# Patient Record
Sex: MALE | Race: WHITE | Employment: OTHER | URBAN - METROPOLITAN AREA
[De-identification: names, ages, dates, MRNs, and addresses within clinical notes are randomized per-mention and may not be internally consistent; named-entity substitution may affect disease eponyms.]

---

## 2022-02-23 ENCOUNTER — APPOINTMENT (OUTPATIENT)
Dept: FAMILY MEDICINE CLINIC | Age: 62
End: 2022-02-23

## 2022-02-25 ENCOUNTER — OFFICE VISIT (OUTPATIENT)
Dept: INTERNAL MEDICINE CLINIC | Age: 62
End: 2022-02-25
Payer: OTHER GOVERNMENT

## 2022-02-25 VITALS
RESPIRATION RATE: 16 BRPM | TEMPERATURE: 98.1 F | DIASTOLIC BLOOD PRESSURE: 84 MMHG | HEART RATE: 61 BPM | WEIGHT: 192.2 LBS | BODY MASS INDEX: 25.47 KG/M2 | HEIGHT: 73 IN | OXYGEN SATURATION: 98 % | SYSTOLIC BLOOD PRESSURE: 142 MMHG

## 2022-02-25 DIAGNOSIS — F41.9 ANXIETY: ICD-10-CM

## 2022-02-25 DIAGNOSIS — Z00.00 PHYSICAL EXAM: ICD-10-CM

## 2022-02-25 DIAGNOSIS — R03.0 ELEVATED BLOOD PRESSURE READING IN OFFICE WITHOUT DIAGNOSIS OF HYPERTENSION: ICD-10-CM

## 2022-02-25 DIAGNOSIS — Z13.220 SCREENING FOR LIPID DISORDERS: ICD-10-CM

## 2022-02-25 DIAGNOSIS — R82.998 BROWN-COLORED URINE: Primary | ICD-10-CM

## 2022-02-25 DIAGNOSIS — Z00.00 LABORATORY EXAM ORDERED AS PART OF ROUTINE GENERAL MEDICAL EXAMINATION: ICD-10-CM

## 2022-02-25 LAB
ALBUMIN SERPL-MCNC: 4.3 G/DL (ref 3.5–5)
ALBUMIN/GLOB SERPL: 1.2 {RATIO} (ref 1.1–2.2)
ALP SERPL-CCNC: 71 U/L (ref 45–117)
ALT SERPL-CCNC: 28 U/L (ref 12–78)
ANION GAP SERPL CALC-SCNC: 5 MMOL/L (ref 5–15)
APPEARANCE UR: CLEAR
AST SERPL-CCNC: 18 U/L (ref 15–37)
BILIRUB SERPL-MCNC: 1.4 MG/DL (ref 0.2–1)
BILIRUB UR QL: NEGATIVE
BUN SERPL-MCNC: 13 MG/DL (ref 6–20)
BUN/CREAT SERPL: 9 (ref 12–20)
CALCIUM SERPL-MCNC: 9.7 MG/DL (ref 8.5–10.1)
CHLORIDE SERPL-SCNC: 103 MMOL/L (ref 97–108)
CHOLEST SERPL-MCNC: 207 MG/DL
CO2 SERPL-SCNC: 29 MMOL/L (ref 21–32)
COLOR UR: NORMAL
CREAT SERPL-MCNC: 1.43 MG/DL (ref 0.7–1.3)
CREAT UR-MCNC: 34.6 MG/DL
ERYTHROCYTE [DISTWIDTH] IN BLOOD BY AUTOMATED COUNT: 12.7 % (ref 11.5–14.5)
GLOBULIN SER CALC-MCNC: 3.5 G/DL (ref 2–4)
GLUCOSE SERPL-MCNC: 98 MG/DL (ref 65–100)
GLUCOSE UR STRIP.AUTO-MCNC: NEGATIVE MG/DL
HCT VFR BLD AUTO: 45.6 % (ref 36.6–50.3)
HDLC SERPL-MCNC: 70 MG/DL
HDLC SERPL: 3 {RATIO} (ref 0–5)
HGB BLD-MCNC: 14.7 G/DL (ref 12.1–17)
HGB UR QL STRIP: NEGATIVE
KETONES UR QL STRIP.AUTO: NEGATIVE MG/DL
LDLC SERPL CALC-MCNC: 103.8 MG/DL (ref 0–100)
LEUKOCYTE ESTERASE UR QL STRIP.AUTO: NEGATIVE
MCH RBC QN AUTO: 30.8 PG (ref 26–34)
MCHC RBC AUTO-ENTMCNC: 32.2 G/DL (ref 30–36.5)
MCV RBC AUTO: 95.6 FL (ref 80–99)
MICROALBUMIN UR-MCNC: <0.5 MG/DL
MICROALBUMIN/CREAT UR-RTO: <14 MG/G (ref 0–30)
NITRITE UR QL STRIP.AUTO: NEGATIVE
NRBC # BLD: 0 K/UL (ref 0–0.01)
NRBC BLD-RTO: 0 PER 100 WBC
PH UR STRIP: 5.5 [PH] (ref 5–8)
PLATELET # BLD AUTO: 242 K/UL (ref 150–400)
PMV BLD AUTO: 11.1 FL (ref 8.9–12.9)
POTASSIUM SERPL-SCNC: 3.6 MMOL/L (ref 3.5–5.1)
PROT SERPL-MCNC: 7.8 G/DL (ref 6.4–8.2)
PROT UR STRIP-MCNC: NEGATIVE MG/DL
PSA SERPL-MCNC: 0.6 NG/ML (ref 0.01–4)
RBC # BLD AUTO: 4.77 M/UL (ref 4.1–5.7)
SODIUM SERPL-SCNC: 137 MMOL/L (ref 136–145)
SP GR UR REFRACTOMETRY: <1.005 (ref 1–1.03)
TRIGL SERPL-MCNC: 166 MG/DL (ref ?–150)
TSH SERPL DL<=0.05 MIU/L-ACNC: 2.95 UIU/ML (ref 0.36–3.74)
UR CULT HOLD, URHOLD: NORMAL
UROBILINOGEN UR QL STRIP.AUTO: 0.2 EU/DL (ref 0.2–1)
VLDLC SERPL CALC-MCNC: 33.2 MG/DL
WBC # BLD AUTO: 6.9 K/UL (ref 4.1–11.1)

## 2022-02-25 PROCEDURE — 99204 OFFICE O/P NEW MOD 45 MIN: CPT | Performed by: NURSE PRACTITIONER

## 2022-02-25 NOTE — PROGRESS NOTES
Bailey Cast is a 57 y.o. male    Chief Complaint   Patient presents with    [facility]     new patient       Visit Vitals  BP (!) 142/100 (BP 1 Location: Left upper arm, BP Patient Position: Sitting, BP Cuff Size: Small adult)   Pulse 61   Temp 98.1 °F (36.7 °C)   Resp 16   Ht 6' 1\" (1.854 m)   Wt 192 lb 3.2 oz (87.2 kg)   SpO2 98%   BMI 25.36 kg/m²           1. Have you been to the ER, urgent care clinic since your last visit? Hospitalized since your last visit? NO    2. Have you seen or consulted any other health care providers outside of the [address] [place] [address] since your last visit? Include any pap smears or colon screening.  NO

## 2022-02-25 NOTE — PROGRESS NOTES
Chief Complaint   Patient presents with    Establish Care     new patient       SUBJECTIVE:    Micheal Maya is a 57 y.o. male who is here today to establish care as a new patient, and to discuss current medical concerns and observations. The patient states no significant medical history in the past.  He states he is conscientious of his health, and exercises regularly and eats a balanced diet. He states he often exercises by running. He denies any chest pain, chest pressure, shortness of breath, headaches, dizziness, blurred vision, palpitations, or syncope episodes. He states a history of one kidney stone in the past in 2015. He has not seen a primary care provider since that time. Chiefly, the patient complains of observing some \"brown urine\" approximately 5 days ago on Sunday evening. He states that it was a singular episode, and after drinking plenty of fluids, his urine returned to \"normal.\"  He states he had no further problems until yesterday when he noticed it was [place] again. \"  He denies any dysuria, urgency, or frequency. He denies any hematuria that he can detect. He denies any flank pain. He is admittedly anxious about these observations, and likely causing his blood pressure to be somewhat elevated today. History reviewed. No pertinent past medical history.   Past Surgical History:   Procedure Laterality Date    HX HEENT      Broke nose     No Known Allergies    REVIEW OF SYSTEMS:                                        POSITIVE= bold text  Negative = regular text    General:                     fever, chills, sweats, generalized weakness, weight loss/gain,                                       loss of appetite   Eyes:                           blurred vision, eye pain, loss of vision, double vision  ENT:                            rhinorrhea, pharyngitis   Respiratory:               cough, sputum production, SOB, CHAVIS, wheezing, pleuritic pain   Cardiology: chest pain, palpitations, orthopnea, PND, edema, syncope   Gastrointestinal:       abdominal pain , N/V, diarrhea, dysphagia, constipation, bleeding   Genitourinary:           frequency, urgency, dysuria, hematuria, incontinence   Muskuloskeletal :      arthralgia, myalgia, back pain  Hematology:              easy bruising, nose or gum bleeding, lymphadenopathy   Dermatological:         rash, ulceration, pruritis, color change / jaundice  Endocrine:                 hot flashes or polydipsia   Neurological:             headache, dizziness, confusion, focal weakness, paresthesia,                                      Speech difficulties, memory loss, gait difficulty  Psychological:          Feelings of anxiety, depression, agitation        Social History     Socioeconomic History    Marital status:    Tobacco Use    Smoking status: Never Smoker    Smokeless tobacco: Never Used   Vaping Use    Vaping Use: Never used   Substance and Sexual Activity    Alcohol use: Yes     Comment: occasionally    Drug use: No    Sexual activity: Yes     Partners: Female     Birth control/protection: None     Social Determinants of Health     Physical Activity: Sufficiently Active    Days of Exercise per Week: 6 days    Minutes of Exercise per Session: 40 min     Family History   Problem Relation Age of Onset    Cancer Father         prostate    Diabetes Mother        OBJECTIVE:     Visit Vitals  BP (!) 142/100 (BP 1 Location: Left upper arm, BP Patient Position: Sitting, BP Cuff Size: Small adult)   Pulse 61   Temp 98.1 °F (36.7 °C)   Resp 16   Ht 6' 1\" (1.854 m)   Wt 192 lb 3.2 oz (87.2 kg)   SpO2 98%   BMI 25.36 kg/m²       Constitutional: He appears well nourished, of stated age, and dressed appropriately. Eyes: Sclera anicteric, PERRLA, EOMI  Neck: Supple without lymphadenopathy.  Thyroid normal, No JVD or bruits  Respiratory: Clear to ascultation X5, normal inspiratory effort, no adventitious breath sounds. Cardiovascular: Regular rate and rhythm, no murmurs, rubs or gallops, PMI not displaced, No thrills, no peripheral edema  Gastrointestinal: Abdomen non-distended, soft, non-tender, bowel sounds normal and active X4. Hematologic: No purpura, petechiae or unexplained bruising  Lymphatic: No lymph node enlargemant. Integumentary: No unusual rashes or suspicious skin lesions noted. Neuro: Non-focal exam, A & O X 3.   Psychiatric: Appropriate affect and demeanor, pleasant and cooperative. Patient's thought content and thought processing appear to be within normal limits. ASSESSMENT/PLAN:     ICD-10-CM ICD-9-CM    1. Brown-colored urine  R82.998 791.9 XR ABD (KUB)      MICROALBUMIN, UR, RAND W/ MICROALB/CREAT RATIO      URINALYSIS W/ RFLX MICROSCOPIC   2. Elevated blood pressure reading in office without diagnosis of hypertension  R03.0 796.2 CBC W/O DIFF      METABOLIC PANEL, COMPREHENSIVE      TSH 3RD GENERATION   3. Anxiety  F41.9 300.00    4. Physical exam  Z00.00 V70.9    5. Laboratory exam ordered as part of routine general medical examination  Z00.00 V72.62 CBC W/O DIFF      METABOLIC PANEL, COMPREHENSIVE      LIPID PANEL      TSH 3RD GENERATION   6. Screening for lipid disorders  Z13.220 V77.91 LIPID PANEL     1: KUB ordered today. Will await results from radiology. 2: Baseline labs ordered today including: CBC, CMP, lipid panel, UA TSH, screening PSA, and urine microalbumin.  3: Patient directed to drink plenty of fluids. Avoid excessive workouts or strenuous exercising for now. 4: I have suggested patient monitor blood pressures at home due to elevation at visit today. 5: Patient to continue healthy lifestyle management and healthy dietary choices. 6: Patient to follow-up with me as discussed.       Orders Placed This Encounter    XR ABD (KUB)    CBC W/O DIFF    METABOLIC PANEL, COMPREHENSIVE    LIPID PANEL    TSH 3RD GENERATION    MICROALBUMIN, UR, RAND W/ MICROALB/CREAT RATIO    URINALYSIS W/ RFLX MICROSCOPIC         ATTENTION:   This medical record was transcribed using an electronic medical records system. Although proofread, it may and can contain electronic and spelling errors. Other human spelling and other errors may be present. Corrections may be executed at a later time. Please feel free to contact us for any clarifications as needed. Signed,  Kelli Clancy.  Rosana Browning, MSN APRN FNP-BC

## 2022-02-28 NOTE — PROGRESS NOTES
Discussed with pt. Pt states that over the weekend he has had a frequent urge to urinate with varied amount of urine when urinating and brief periods of difficulty urinating. Pt believes this might be a kidney stone. Pt states the color is a normal yellow and not dark like coffee like it was during his previous appt with us. Will patient have to drop off another urine sample or come back for a acute appt?

## 2022-02-28 NOTE — PROGRESS NOTES
Prostate level looks healthy. Urinalysis shows no signs of infection or leakage of protein. Thyroid shows to be functioning normally. Cholesterol levels are borderline higher than normal.  Focus on a low red meat diet, primarily chicken and fish that is either baked, broiled, grilled, avoid fatty and fried foods, and eat plenty of vegetables that are rich in fiber and color. Electrolytes and liver functions are normal.  Kidney function shows some mild age-related decline. Avoid excess NSAIDs and drink plenty of water. Repeat labs in 6 months.

## 2022-03-02 NOTE — PROGRESS NOTES
Called pt and pt would like to talk to provider before coming into the office to check his prostate. Pt was concerned he might have kidney stones and would like to be explained why he needs a prostate exam from provider. I scheduled him a appt for this Friday at 8:15 AM for prostate check. Message re: This sounds less like a kidney stone, and more like a prostate issue. Please have patient come in and see me about this. I will likely need to manually check his prostate at that time.

## 2022-03-04 NOTE — PROGRESS NOTES
Please see correspondence notes between the patient and I regarding his symptoms. As of 03/03/2022, the patient states that his symptoms have improved significantly, and is willing to hold off doing any further evaluation for now. He will inform me if symptoms worsen.

## 2023-05-05 ENCOUNTER — OFFICE VISIT (OUTPATIENT)
Dept: FAMILY MEDICINE CLINIC | Age: 63
End: 2023-05-05

## 2023-05-05 VITALS
WEIGHT: 190.2 LBS | DIASTOLIC BLOOD PRESSURE: 66 MMHG | SYSTOLIC BLOOD PRESSURE: 113 MMHG | OXYGEN SATURATION: 98 % | HEIGHT: 73 IN | RESPIRATION RATE: 18 BRPM | BODY MASS INDEX: 25.21 KG/M2 | TEMPERATURE: 97.8 F | HEART RATE: 67 BPM

## 2023-05-05 DIAGNOSIS — Z12.11 COLON CANCER SCREENING: ICD-10-CM

## 2023-05-05 DIAGNOSIS — Z00.00 ANNUAL PHYSICAL EXAM: Primary | ICD-10-CM

## 2023-05-26 LAB
ALBUMIN SERPL-MCNC: 4.3 G/DL (ref 3.5–5)
ALBUMIN/GLOB SERPL: 1.4 (ref 1.1–2.2)
ALP SERPL-CCNC: 64 U/L (ref 45–117)
ALT SERPL-CCNC: 32 U/L (ref 12–78)
ANION GAP SERPL CALC-SCNC: 5 MMOL/L (ref 5–15)
AST SERPL-CCNC: 24 U/L (ref 15–37)
BILIRUB SERPL-MCNC: 1.1 MG/DL (ref 0.2–1)
BUN SERPL-MCNC: 14 MG/DL (ref 6–20)
BUN/CREAT SERPL: 11 (ref 12–20)
CALCIUM SERPL-MCNC: 9.3 MG/DL (ref 8.5–10.1)
CHLORIDE SERPL-SCNC: 103 MMOL/L (ref 97–108)
CHOLEST SERPL-MCNC: 193 MG/DL
CO2 SERPL-SCNC: 30 MMOL/L (ref 21–32)
CREAT SERPL-MCNC: 1.31 MG/DL (ref 0.7–1.3)
ERYTHROCYTE [DISTWIDTH] IN BLOOD BY AUTOMATED COUNT: 12 % (ref 11.5–14.5)
GLOBULIN SER CALC-MCNC: 3.1 G/DL (ref 2–4)
GLUCOSE SERPL-MCNC: 90 MG/DL (ref 65–100)
HCT VFR BLD AUTO: 42.9 % (ref 36.6–50.3)
HDLC SERPL-MCNC: 59 MG/DL
HDLC SERPL: 3.3 (ref 0–5)
HGB BLD-MCNC: 14.4 G/DL (ref 12.1–17)
LDLC SERPL CALC-MCNC: 111 MG/DL (ref 0–100)
MCH RBC QN AUTO: 31.5 PG (ref 26–34)
MCHC RBC AUTO-ENTMCNC: 33.6 G/DL (ref 30–36.5)
MCV RBC AUTO: 93.9 FL (ref 80–99)
NRBC # BLD: 0 K/UL (ref 0–0.01)
NRBC BLD-RTO: 0 PER 100 WBC
PLATELET # BLD AUTO: 228 K/UL (ref 150–400)
PMV BLD AUTO: 11.2 FL (ref 8.9–12.9)
POTASSIUM SERPL-SCNC: 4.3 MMOL/L (ref 3.5–5.1)
PROT SERPL-MCNC: 7.4 G/DL (ref 6.4–8.2)
PSA SERPL-MCNC: 0.7 NG/ML (ref 0–4)
RBC # BLD AUTO: 4.57 M/UL (ref 4.1–5.7)
REFLEX CRITERIA: NORMAL
SODIUM SERPL-SCNC: 138 MMOL/L (ref 136–145)
TRIGL SERPL-MCNC: 115 MG/DL (ref ?–150)
TSH SERPL DL<=0.05 MIU/L-ACNC: 1.55 UIU/ML (ref 0.36–3.74)
VLDLC SERPL CALC-MCNC: 23 MG/DL
WBC # BLD AUTO: 6.4 K/UL (ref 4.1–11.1)

## 2023-09-21 ENCOUNTER — HOSPITAL ENCOUNTER (EMERGENCY)
Facility: HOSPITAL | Age: 63
Discharge: HOME | End: 2023-09-21
Attending: STUDENT IN AN ORGANIZED HEALTH CARE EDUCATION/TRAINING PROGRAM
Payer: OTHER GOVERNMENT

## 2023-09-21 ENCOUNTER — HOSPITAL ENCOUNTER (OUTPATIENT)
Facility: HOSPITAL | Age: 63
Setting detail: INFUSION SERIES
End: 2023-09-21
Payer: OTHER GOVERNMENT

## 2023-09-21 ENCOUNTER — OFFICE VISIT (OUTPATIENT)
Age: 63
End: 2023-09-21

## 2023-09-21 VITALS
HEIGHT: 73 IN | RESPIRATION RATE: 20 BRPM | TEMPERATURE: 97.5 F | SYSTOLIC BLOOD PRESSURE: 129 MMHG | DIASTOLIC BLOOD PRESSURE: 87 MMHG | HEART RATE: 59 BPM | WEIGHT: 197.2 LBS | OXYGEN SATURATION: 98 % | BODY MASS INDEX: 26.14 KG/M2

## 2023-09-21 VITALS
SYSTOLIC BLOOD PRESSURE: 125 MMHG | RESPIRATION RATE: 17 BRPM | HEIGHT: 73 IN | OXYGEN SATURATION: 99 % | BODY MASS INDEX: 25.54 KG/M2 | HEART RATE: 61 BPM | DIASTOLIC BLOOD PRESSURE: 71 MMHG | TEMPERATURE: 97.7 F | WEIGHT: 192.7 LBS

## 2023-09-21 VITALS
RESPIRATION RATE: 16 BRPM | DIASTOLIC BLOOD PRESSURE: 91 MMHG | SYSTOLIC BLOOD PRESSURE: 135 MMHG | HEIGHT: 73 IN | HEART RATE: 57 BPM | BODY MASS INDEX: 25.65 KG/M2 | WEIGHT: 193.56 LBS | OXYGEN SATURATION: 100 % | TEMPERATURE: 97.7 F

## 2023-09-21 DIAGNOSIS — S10.96XA INSECT BITE OF NECK, INITIAL ENCOUNTER: ICD-10-CM
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
## 2023-09-21 DIAGNOSIS — L08.9 SKIN INFECTION: Primary | ICD-10-CM

## 2023-09-21 DIAGNOSIS — I89.1 LYMPHANGITIS: Primary | ICD-10-CM

## 2023-09-21 DIAGNOSIS — S10.86XA INSECT BITE OF OTHER PART OF NECK, INITIAL ENCOUNTER: Primary | ICD-10-CM
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
## 2023-09-21 DIAGNOSIS — I89.1 LYMPHANGITIS: ICD-10-CM

## 2023-09-21 LAB
ALBUMIN SERPL-MCNC: 3.9 G/DL (ref 3.5–5)
ALBUMIN/GLOB SERPL: 1.1 (ref 1.1–2.2)
ALP SERPL-CCNC: 73 U/L (ref 45–117)
ALT SERPL-CCNC: 34 U/L (ref 12–78)
ANION GAP SERPL CALC-SCNC: 3 MMOL/L (ref 5–15)
AST SERPL-CCNC: 23 U/L (ref 15–37)
BASOPHILS # BLD: 0 K/UL (ref 0–0.1)
BASOPHILS NFR BLD: 1 % (ref 0–1)
BILIRUB SERPL-MCNC: 1.6 MG/DL (ref 0.2–1)
BUN SERPL-MCNC: 17 MG/DL (ref 6–20)
BUN/CREAT SERPL: 13 (ref 12–20)
CALCIUM SERPL-MCNC: 9.1 MG/DL (ref 8.5–10.1)
CHLORIDE SERPL-SCNC: 106 MMOL/L (ref 97–108)
CO2 SERPL-SCNC: 32 MMOL/L (ref 21–32)
CREAT SERPL-MCNC: 1.32 MG/DL (ref 0.7–1.3)
DIFFERENTIAL METHOD BLD: NORMAL
EOSINOPHIL # BLD: 0.2 K/UL (ref 0–0.4)
EOSINOPHIL NFR BLD: 3 % (ref 0–7)
ERYTHROCYTE [DISTWIDTH] IN BLOOD BY AUTOMATED COUNT: 12.1 % (ref 11.5–14.5)
GLOBULIN SER CALC-MCNC: 3.7 G/DL (ref 2–4)
GLUCOSE SERPL-MCNC: 91 MG/DL (ref 65–100)
HCT VFR BLD AUTO: 42 % (ref 36.6–50.3)
HGB BLD-MCNC: 14.2 G/DL (ref 12.1–17)
IMM GRANULOCYTES # BLD AUTO: 0 K/UL (ref 0–0.04)
IMM GRANULOCYTES NFR BLD AUTO: 0 % (ref 0–0.5)
LYMPHOCYTES # BLD: 2.3 K/UL (ref 0.8–3.5)
LYMPHOCYTES NFR BLD: 36 % (ref 12–49)
MCH RBC QN AUTO: 31.6 PG (ref 26–34)
MCHC RBC AUTO-ENTMCNC: 33.8 G/DL (ref 30–36.5)
MCV RBC AUTO: 93.5 FL (ref 80–99)
MONOCYTES # BLD: 0.7 K/UL (ref 0–1)
MONOCYTES NFR BLD: 10 % (ref 5–13)
NEUTS SEG # BLD: 3.2 K/UL (ref 1.8–8)
NEUTS SEG NFR BLD: 50 % (ref 32–75)
NRBC # BLD: 0 K/UL (ref 0–0.01)
NRBC BLD-RTO: 0 PER 100 WBC
PLATELET # BLD AUTO: 230 K/UL (ref 150–400)
PMV BLD AUTO: 10.2 FL (ref 8.9–12.9)
POTASSIUM SERPL-SCNC: 3.8 MMOL/L (ref 3.5–5.1)
PROT SERPL-MCNC: 7.6 G/DL (ref 6.4–8.2)
RBC # BLD AUTO: 4.49 M/UL (ref 4.1–5.7)
SODIUM SERPL-SCNC: 141 MMOL/L (ref 136–145)
WBC # BLD AUTO: 6.3 K/UL (ref 4.1–11.1)

## 2023-09-21 PROCEDURE — 99283 EMERGENCY DEPT VISIT LOW MDM: CPT

## 2023-09-21 PROCEDURE — 96365 THER/PROPH/DIAG IV INF INIT: CPT

## 2023-09-21 PROCEDURE — 6360000002 HC RX W HCPCS: Performed by: FAMILY MEDICINE

## 2023-09-21 PROCEDURE — 36415 COLL VENOUS BLD VENIPUNCTURE: CPT

## 2023-09-21 PROCEDURE — 80053 COMPREHEN METABOLIC PANEL: CPT

## 2023-09-21 PROCEDURE — 2580000003 HC RX 258: Performed by: FAMILY MEDICINE

## 2023-09-21 PROCEDURE — 85025 COMPLETE CBC W/AUTO DIFF WBC: CPT

## 2023-09-21 RX ADMIN — DALBAVANCIN 1500 MG: 500 INJECTION, POWDER, FOR SOLUTION INTRAVENOUS at 14:33

## 2023-09-21 ASSESSMENT — ENCOUNTER SYMPTOMS
ABDOMINAL PAIN: 0
COUGH: 0
NAUSEA: 0
SHORTNESS OF BREATH: 0
BACK PAIN: 0
VOMITING: 0

## 2023-09-21 ASSESSMENT — PAIN SCALES - GENERAL: PAINLEVEL_OUTOF10: 0

## 2023-09-21 ASSESSMENT — PAIN - FUNCTIONAL ASSESSMENT: PAIN_FUNCTIONAL_ASSESSMENT: NONE - DENIES PAIN

## 2023-09-21 NOTE — CARE COORDINATION
CARE MANAGEMENT NOTE      CM received consult for outpatient [facility] infusion. CM spoke with Torres Rucker (charge nurse at [facility]).  Appt scheduled for 2pm. Appt added to AVS. Provider updated.     _____________________________________  Catherine Cohen, [address] Management   Available via [facility]  9/21/2023   10:56 AM Specimens verified per policy.

## 2023-09-21 NOTE — ED PROVIDER NOTES
[facility] EMERGENCY DEPT  EMERGENCY DEPARTMENT ENCOUNTER      Pt Name: Darlyn Carrillo  MRN: [id number]  [address] [facility] [place] 1960  Date of evaluation: 9/21/2023  Provider: BOBO Newsome NP    CHIEF COMPLAINT     Rash        HISTORY OF PRESENT ILLNESS   (Location/Symptom, Timing/Onset, Context/Setting, Quality, Duration, Modifying Factors, Severity)  Note limiting factors. Patient is a 58-year-old male with no pmh presenting to the emergency department for evaluation of insect bite. Reports a few days ago noticed an insect bite to his left shoulder/neck area. Did not see what bit him. Denies removing a tic. Yesterday, noticed streaking up his neck. Went to urgent care, who advised him to come to the emergency room for evaluation of lymphangitis. Denies fevers. No other complaints. The history is provided by the patient. Review of External Medical Records:     Nursing Notes were reviewed. REVIEW OF SYSTEMS    (2-9 systems for level 4, 10 or more for level 5)     Review of Systems   Constitutional:  Negative for unexpected weight change. HENT:  Negative for congestion. Eyes:  Negative for visual disturbance. Respiratory:  Negative for cough and shortness of breath. Cardiovascular:  Negative for chest pain and palpitations. Gastrointestinal:  Negative for abdominal pain, nausea and vomiting. Endocrine: Negative for polyuria. Genitourinary:  Negative for dysuria and flank pain. Musculoskeletal:  Negative for back pain. Skin:  Positive for rash. Negative for pallor. Allergic/Immunologic: Negative for immunocompromised state. Neurological:  Negative for dizziness and headaches. Hematological:  Negative for adenopathy. Psychiatric/Behavioral:  Negative for agitation. Except as noted above the remainder of the review of systems was reviewed and negative. PAST MEDICAL HISTORY   No past medical history on file.       SURGICAL HISTORY       Past Surgical History: